# Patient Record
Sex: MALE
[De-identification: names, ages, dates, MRNs, and addresses within clinical notes are randomized per-mention and may not be internally consistent; named-entity substitution may affect disease eponyms.]

---

## 2018-07-17 ENCOUNTER — HOSPITAL ENCOUNTER (OUTPATIENT)
Dept: HOSPITAL 88 - RAD | Age: 63
End: 2018-07-17
Attending: SPECIALIST
Payer: MEDICARE

## 2018-07-17 DIAGNOSIS — J44.9: Primary | ICD-10-CM

## 2018-07-17 DIAGNOSIS — M17.12: ICD-10-CM

## 2018-07-17 PROCEDURE — 71046 X-RAY EXAM CHEST 2 VIEWS: CPT

## 2018-07-17 NOTE — DIAGNOSTIC IMAGING REPORT
PROCEDURE:X-RAY LEFT KNEE, THREE OR MORE VIEWS

 

COMPARISON:None.

 

INDICATIONS:OSTEOARTHRITIS LEFT KNEE

 

FINDINGS:

The bones are well-mineralized. No evidence of fracture or 

malalignment. Mild joint space narrowing is present in the medial and 

patellofemoral compartments with bony osteophyte formation. Small 

suprapatellar joint effusion.

 

CONCLUSION:

Mild medial and patellofemoral compartment osteoarthritis. 

 

Small suprapatellar joint effusion. 

 

Dictated by:  ALPHONSE MCCRARY M.D. on 7/17/2018 at 10:52     

Electronically approved by:  ALPHONSE MCCRARY M.D. on 7/17/2018 at 10:52

## 2018-07-17 NOTE — DIAGNOSTIC IMAGING REPORT
PROCEDURE:

Frontal and lateral views of the chest.

 

COMPARISON: Chest radiograph 10/2/2015.

 

INDICATIONS:   COPD

     

FINDINGS:

Lines/tubes:  None.

 

Lungs: Low lung volumes. No evidence of pulmonary edema. There is 

linear subsegmental atelectasis at the right lung base. Mild patchy 

opacities are present in the left lung base.

 

Pleura:  There is no pleural effusion or pneumothorax.

 

Heart and mediastinum:  The cardiomediastinal silhouette is 

unremarkable.

 

Bones:  No acute bony abnormality.

 

IMPRESSION: 

Patchy opacities at the left lung base, which may reflect atelectasis 

or pneumonia in the appropriate clinical setting. 

 

Subsegmental atelectasis at the right lung base.

 

Dictated by:  ALPHONSE MCCRARY M.D. on 7/17/2018 at 10:49     

Electronically approved by:  ALPHONSE MCCRARY M.D. on 7/17/2018 at 10:49

## 2018-10-01 ENCOUNTER — HOSPITAL ENCOUNTER (OUTPATIENT)
Dept: HOSPITAL 88 - CT | Age: 63
End: 2018-10-01
Attending: INTERNAL MEDICINE
Payer: MEDICARE

## 2018-10-01 DIAGNOSIS — R05: Primary | ICD-10-CM

## 2018-10-01 DIAGNOSIS — J01.80: ICD-10-CM

## 2018-10-01 PROCEDURE — 70486 CT MAXILLOFACIAL W/O DYE: CPT

## 2018-10-01 NOTE — DIAGNOSTIC IMAGING REPORT
Examination: CT Face without Contrast

History: Facial pressure. Cough.

Comparison studies: None



Technique:

Axial images were obtained through the maxillofacial region. Coronal and

sagittal reconstructions obtained from the axial data. 

Dose modulation, iterative reconstruction, and/or weight based adjustment of

the mA/kV was utilized to reduce the radiation dose to as low as reasonably

achievable. 

Intravenous contrast: None



Findings:



Soft tissues: 

No abnormalities.



Bones: 

No fractures or bony abnormalities.



Orbits: 

Globes: Intact

Extra or intraconal abnormalities: None.



Paranasal sinuses:

Mild inflammatory mucosal thickening of the bilateral maxillary and sphenoid

sinuses and bilateral ethmoid air cells.

Focal obstruction at the ostia of the bilateral ostiomeatal units and

sphenoethmoidal recesses.



Nasal cavity:

Rightward deviation (5 mm).



IMPRESSION: 



1.  No acute facial abnormality. 



2.  Mild inflammatory mucosal thickening of the bilateral maxillary and

sphenoid sinuses and ethmoid air cells. 



Signed by: Dr. Elizabeth Pyle M.D. on 10/1/2018 5:44 PM

## 2019-07-03 ENCOUNTER — HOSPITAL ENCOUNTER (OUTPATIENT)
Dept: HOSPITAL 88 - CT | Age: 64
End: 2019-07-03
Attending: SPECIALIST
Payer: MEDICARE

## 2019-07-03 DIAGNOSIS — R22.1: Primary | ICD-10-CM

## 2019-07-03 LAB
BUN SERPL-MCNC: 13 MG/DL (ref 7–26)
BUN/CREAT SERPL: 15 (ref 6–25)
EGFRCR SERPLBLD CKD-EPI 2021: > 60 ML/MIN (ref 60–?)

## 2019-07-03 PROCEDURE — 84520 ASSAY OF UREA NITROGEN: CPT

## 2019-07-03 PROCEDURE — 36415 COLL VENOUS BLD VENIPUNCTURE: CPT

## 2019-07-03 PROCEDURE — 82565 ASSAY OF CREATININE: CPT

## 2019-07-03 PROCEDURE — 70491 CT SOFT TISSUE NECK W/DYE: CPT

## 2019-07-03 NOTE — DIAGNOSTIC IMAGING REPORT
Examination:CT SOFT TISSUE NECK WITH CONTRAST



History: Lump in the right anterior neck

Comparison studies: None



Technique: 

Axial images from the skull base to the thoracic inlet

Coronal and sagittal reformatted images.

Dose modulation, iterative reconstruction, and/or weight based adjustment of

the mA/kV was utilized to reduce the radiation dose to as low as reasonably

achievable. 

Intravenous contrast: 100mL of Isovue 370.



Findings:



Soft tissues:

No abnormalities.



Aerodigestive tract: 

No abnormality.



Lymph nodes:

No radiographically significant adenopathy.



Vessels: 

Arteries and veins are patent.



Thyroid gland: 

Normal in size and homogeneous.



Submandibular glands:

Normal in size and homogeneous.



Parotid glands:

Normal in size and homogeneous.



Orbits: No abnormalities.

Paranasal sinuses: Clear.

Temporal bones: No abnormalities.

Skull base and facial bones:  Intact.



Cervical spine: 

C5-C: Diffuse disc osteophyte complex and mild bilateral uncovertebral

arthropathy result in moderate to severe bilateral foraminal stenosis. No canal

stenosis.

The remaining cervical level demonstrate no disc bulge or herniation or

foraminal or canal stenosis.



Visualized lung apices:

No abnormalities.



IMPRESSION:



1.  No lesion in the right neck.



2.  Mild degenerative changes at C5-C6 with moderate to severe bilateral

foraminal stenosis. No canal stenosis.





Signed by: Dr. Elizabeth Pyle M.D. on 7/3/2019 5:39 PM

## 2020-07-02 ENCOUNTER — HOSPITAL ENCOUNTER (EMERGENCY)
Dept: HOSPITAL 88 - ER | Age: 65
LOS: 1 days | Discharge: HOME | End: 2020-07-03
Payer: MEDICARE

## 2020-07-02 VITALS — WEIGHT: 245 LBS | HEIGHT: 72 IN | BODY MASS INDEX: 33.18 KG/M2

## 2020-07-02 DIAGNOSIS — R50.9: ICD-10-CM

## 2020-07-02 DIAGNOSIS — R05: ICD-10-CM

## 2020-07-02 DIAGNOSIS — U07.1: Primary | ICD-10-CM

## 2020-07-02 DIAGNOSIS — R09.02: ICD-10-CM

## 2020-07-02 LAB
ALBUMIN SERPL-MCNC: 3.6 G/DL (ref 3.5–5)
ALBUMIN/GLOB SERPL: 1.1 {RATIO} (ref 0.8–2)
ALP SERPL-CCNC: 57 IU/L (ref 40–150)
ALT SERPL-CCNC: 38 IU/L (ref 0–55)
ANION GAP SERPL CALC-SCNC: 14.9 MMOL/L (ref 8–16)
BASOPHILS # BLD AUTO: 0 10*3/UL (ref 0–0.1)
BASOPHILS NFR BLD AUTO: 0.1 % (ref 0–1)
BUN SERPL-MCNC: 17 MG/DL (ref 7–26)
BUN/CREAT SERPL: 18 (ref 6–25)
CALCIUM SERPL-MCNC: 9.1 MG/DL (ref 8.4–10.2)
CHLORIDE SERPL-SCNC: 98 MMOL/L (ref 98–107)
CK MB SERPL-MCNC: 6.8 NG/ML (ref 0–5)
CK SERPL-CCNC: 386 IU/L (ref 30–200)
CO2 SERPL-SCNC: 27 MMOL/L (ref 22–29)
DEPRECATED NEUTROPHILS # BLD AUTO: 5.5 10*3/UL (ref 2.1–6.9)
EGFRCR SERPLBLD CKD-EPI 2021: > 60 ML/MIN (ref 60–?)
EOSINOPHIL # BLD AUTO: 0 10*3/UL (ref 0–0.4)
EOSINOPHIL NFR BLD AUTO: 0 % (ref 0–6)
ERYTHROCYTE [DISTWIDTH] IN CORD BLOOD: 13.3 % (ref 11.7–14.4)
GLOBULIN PLAS-MCNC: 3.2 G/DL (ref 2.3–3.5)
GLUCOSE SERPLBLD-MCNC: 105 MG/DL (ref 74–118)
HCT VFR BLD AUTO: 46.4 % (ref 38.2–49.6)
HGB BLD-MCNC: 15.6 G/DL (ref 14–18)
LYMPHOCYTES # BLD: 0.8 10*3/UL (ref 1–3.2)
LYMPHOCYTES NFR BLD AUTO: 11 % (ref 18–39.1)
MCH RBC QN AUTO: 30.1 PG (ref 28–32)
MCHC RBC AUTO-ENTMCNC: 33.6 G/DL (ref 31–35)
MCV RBC AUTO: 89.4 FL (ref 81–99)
MONOCYTES # BLD AUTO: 0.6 10*3/UL (ref 0.2–0.8)
MONOCYTES NFR BLD AUTO: 8.4 % (ref 4.4–11.3)
NEUTS SEG NFR BLD AUTO: 80.1 % (ref 38.7–80)
PLATELET # BLD AUTO: 150 X10E3/UL (ref 140–360)
POTASSIUM SERPL-SCNC: 3.9 MMOL/L (ref 3.5–5.1)
RBC # BLD AUTO: 5.19 X10E6/UL (ref 4.3–5.7)
SODIUM SERPL-SCNC: 136 MMOL/L (ref 136–145)

## 2020-07-02 PROCEDURE — 80053 COMPREHEN METABOLIC PANEL: CPT

## 2020-07-02 PROCEDURE — 85025 COMPLETE CBC W/AUTO DIFF WBC: CPT

## 2020-07-02 PROCEDURE — 82553 CREATINE MB FRACTION: CPT

## 2020-07-02 PROCEDURE — 84484 ASSAY OF TROPONIN QUANT: CPT

## 2020-07-02 PROCEDURE — 93005 ELECTROCARDIOGRAM TRACING: CPT

## 2020-07-02 PROCEDURE — 71045 X-RAY EXAM CHEST 1 VIEW: CPT

## 2020-07-02 PROCEDURE — 82550 ASSAY OF CK (CPK): CPT

## 2020-07-02 PROCEDURE — 36415 COLL VENOUS BLD VENIPUNCTURE: CPT

## 2020-07-02 PROCEDURE — 99284 EMERGENCY DEPT VISIT MOD MDM: CPT

## 2020-07-02 PROCEDURE — 83880 ASSAY OF NATRIURETIC PEPTIDE: CPT

## 2020-07-02 PROCEDURE — 87635 SARS-COV-2 COVID-19 AMP PRB: CPT

## 2020-07-02 NOTE — DIAGNOSTIC IMAGING REPORT
EXAMINATION:  CHEST SINGLE (PORTABLE)    



INDICATION:      

Shortness of breath



COMPARISON:  None

     

FINDINGS:  AP view   



TUBES and LINES:  None.



LUNGS: Subtle left lower lobe airspace consolidation. The right lung is grossly

clear.



PLEURA:  No pleural effusion or pneumothorax.



HEART AND MEDIASTINUM:  The cardiomediastinal silhouette is unremarkable.    



BONES AND SOFT TISSUES:  No acute osseous lesion.  Soft tissues are

unremarkable.



UPPER ABDOMEN: No free air under the diaphragm.    



IMPRESSION: 



Subtle left lower lobe airspace consolidation suggestive of pneumonia. A

follow-up chest x-ray is recommended in 6 weeks to document resolution.





Signed by: Aris King MD on 7/2/2020 10:46 PM

## 2020-07-02 NOTE — EMERGENCY DEPARTMENT NOTE
History of Present Illnes


History of Present Illness


History of Present Illness


This is a 64 year old  male sent by PCP for evaluation possible COVID-19

infection. Spouse with positive results .





Past Medical/Family History


Physician Review


I have reviewed the patient's past medical and family history.  Any updates have

been documented here.





Past Medical History


Other Medical History:  


LYPHEDEMA





HIGH CHOLESTEROL


Other Surgery:  


LEFT ROTATOR CUFF





LEFT KNEE SURGERY





BILATERAL CARPAL TUNNEL





TONSILECTOMY





Other


Last Tetanus:  UTD





Physical Exam


Related Data


Allergies:  


Coded Allergies:  


     No Known Allergies (Unverified , 3/12/13)





Physical Exam


CONSTITUTIONAL





Constitutional:  Present well-developed, Present well-nourished, Present ill 

appearing


HENT


HENT:  Present normocephalic, Present atraumatic, Present oropharynx sanjana

r/moist, Present nose normal


HENT L/R:  Present left ext ear normal, Present right ext ear normal


EYES





Eyes:  Reports PERRL, Reports conjunctivae normal


NECK


Neck:  Present ROM normal


PULMONARY


Pulmonary:  Present effort normal, Present breath sounds normal


CARDIOVASCULAR





Cardiovascular:  Present regular rhythm, Present heart sounds normal, Present 

capillary refill normal, Present normal rate


GASTROINTESTINAL





Abdominal:  Present soft, Present nontender, Present bowel sounds normal


GENITOURINARY





Genitourinary:  Present exam deferred


SKIN


Skin:  Present warm, Present dry


MUSCULOSKELETAL





Musculoskeletal:  Present ROM normal


NEUROLOGICAL





Neurological:  Present alert, Present oriented x 3, Present no gross motor or 

sensory deficits


PSYCHOLOGICAL


Psychological:  Present mood/affect normal, Present judgement normal





Results


Imaging


Imaging results reviewed:  Yes


Impressions





[place] [id number]








Patient Name: CHEYANNE MONTEJO         MR #: [id number]      


: 1955   Age/Sex: 64/M


Acct #: [id number]   Req #: [id number]


Adm Physician:       


Ordered by: SALLY MENDOZA DO      Report #: [id number]   


Location:       Room/Bed:    


 

________________________________________________________________________________


___________________





Procedure: [id number] DX/CHEST SINGLE (PORTABLE)


Exam Date: 20                            Exam Time: 0








REPORT STATUS: Signed





EXAMINATION:  CHEST SINGLE (PORTABLE)    





INDICATION:      


Shortness of breath





COMPARISON:  None


     


FINDINGS:  AP view   





TUBES and LINES:  None.





LUNGS: Subtle left lower lobe airspace consolidation. The right lung is grossly


clear.





PLEURA:  No pleural effusion or pneumothorax.





HEART AND MEDIASTINUM:  The cardiomediastinal silhouette is unremarkable.    





BONES AND SOFT TISSUES:  No acute osseous lesion.  Soft tissues are


unremarkable.





UPPER ABDOMEN: No free air under the diaphragm.    





IMPRESSION: 





Subtle left lower lobe airspace consolidation suggestive of pneumonia. A


follow-up chest x-ray is recommended in 6 weeks to document resolution.








Signed by: Felicia Alexandra MD on 2020 10:46 PM








Dictated By: FELICIA ALEXANDRA MD


Electronically Signed By: FELICIA ALEXANDRA MD on 20


Transcribed By: TI on 20 








COPY TO:   SALLY MENDOZA DO~





Procedures


12 Lead ECG Interpretation


ECG Interpretation :  


   ECG:  ECG 1


   :  Interpreted by ED physician


   Date:  2020


   Time:  21:56


   Prior ECG tracings:  reviewed


   Rhythm:  sinus rhythm


   Rate:  normal


   BPM:  85


   ST segments normal:  Yes


   T waves normal:  Yes


   Pacin% capture


   Clinical Impression:  normal ECG





Assessment & Plan


Assessment & Plan


Final Impression:  


(1) Hypoxia


Home Meds


Reported Medications


Fluticasone Propionate (FLOVENT DISKUS) 50 Mcg Disk.w.dev


   3/12/13


Azelastine Hcl (AZELASTINE HCL) 6 Ml Drops, OS BID


   3/12/13


Garlic (GARLIC) 1,000 Mg Capsule, 1000 MG PO DAILY


   3/12/13


Simvastatin (SIMVASTATIN) 20 Mg Tablet, 20 MG PO DAILY


   3/12/13


Hydrochlorothiazide* (ESIDRIX*) 25 Mg Tab, 75 MG PO DAILY


   3/12/13


Hydrocodone Bit/Acetaminophen* (VICODIN 5-500 TABLET*) 1 Each Tablet, 5 MG PO 

PRN


   3/12/13











SALLY MENDOZA DO                 2020 20:53

## 2020-07-03 VITALS — DIASTOLIC BLOOD PRESSURE: 72 MMHG | SYSTOLIC BLOOD PRESSURE: 104 MMHG

## 2020-07-06 ENCOUNTER — HOSPITAL ENCOUNTER (EMERGENCY)
Dept: HOSPITAL 88 - ER | Age: 65
Discharge: HOME | End: 2020-07-06
Payer: MEDICARE

## 2020-07-06 VITALS — HEIGHT: 72 IN | BODY MASS INDEX: 33.18 KG/M2 | WEIGHT: 245 LBS

## 2020-07-06 DIAGNOSIS — I10: ICD-10-CM

## 2020-07-06 DIAGNOSIS — R05: ICD-10-CM

## 2020-07-06 DIAGNOSIS — Z85.46: ICD-10-CM

## 2020-07-06 DIAGNOSIS — U07.1: Primary | ICD-10-CM

## 2020-07-06 DIAGNOSIS — E78.5: ICD-10-CM

## 2020-07-06 DIAGNOSIS — J44.9: ICD-10-CM

## 2020-07-06 PROCEDURE — 99282 EMERGENCY DEPT VISIT SF MDM: CPT

## 2020-07-06 PROCEDURE — 71045 X-RAY EXAM CHEST 1 VIEW: CPT

## 2020-07-06 NOTE — NUR
CALLED BY DR JIMENEZ TO ARRANGE HOME 02 ON THIS PT

SATS 88% ON ROOM AIR

CHOICE LETTER SIGNED BY PT AND COPY TO PT ALONG WITH MY BUSINESS CARD

CALLED AND FAXED ORDERS TO [facility]

PH: [phone]  THN-[phone]

CONFIRMATION REC'D

CONFIRMED PT'S ADDRESS AND PHONE NUMBER CORRECT

EXPLAINED TO PT TO CALL [facility] WHEN HE GETS HOME FOR CONCENTRATOR DELIVERY

PROVIDED PT WITH PORTABLE 02 TO GET HOME

## 2020-07-06 NOTE — DIAGNOSTIC IMAGING REPORT
EXAMINATION:  CHEST SINGLE (PORTABLE)    



INDICATION: Pneumonia



COMPARISON: Chest radiograph 7/2/2020

     

FINDINGS:



LINES/TUBES:None



LUNGS:Left greater than right bibasilar patchy opacities.



PLEURA:No pleural effusion or pneumothorax.



MEDIASTINUM:The cardiomediastinal silhouette appears normal in size and shape.



BONES/SOFT TISSUES:No acute osseous injury.



ABDOMEN:No free air under the diaphragm.





IMPRESSION: 

Left greater than right bibasilar patchy opacities may represent subsegmental

atelectasis however superimposed pneumonia could also have this appearance in

the proper clinical setting.



Signed by: Clayton Barlow MD on 7/6/2020 3:58 PM

## 2020-07-06 NOTE — EMERGENCY DEPARTMENT NOTE
History of Present Illnes


History of Present Illness


Chief Complaint:  COVID PUI


History of Present Illness


This is a 64 year old  male arrives to the ED with continued cough, 

patient tested positive Covid 19. Patient states feels a history of COPD, is not

on home oxygen. Patient states the coughing got worse which prompted him to the 

emergency department.


Arrival Mode:  Car


Onset (how long ago):  day(s)


Severity:  mild


Onset quality:  gradual


Timing of current episode:  constant


Treatments prior to arrival:  none





Past Medical/Family History


Physician Review


I have reviewed the patient's past medical and family history.  Any updates have

been documented here.





Past Medical History


Recent Fever:  Yes


Clinical Suspicion of Infectio:  Yes


New/Unexplained Change in Ment:  No


Past Medical History:  Hypertension, COPD, Asthma, Cancer, Hyperlipedemia


Other Medical History:  


PROSTATE CANCER


Past Surgical History:  Knee Replacement, Back Surgery


Other Surgery:  


BILATERAL WRIST





LEFT KNEE





LEFT SHOULDER





Social History


Physically hurt or threatened:  No





Other


Last Tetanus:  UTD





Review of Systems


Review of Systems


Constitutional:  Reports as per HPI, Reports chills, Reports fever


EENTM:  Reports no symptoms


Cardiovascular:  Reports no symptoms


Respiratory:  Reports as per HPI, Reports cough


Gastrointestinal:  Reports no symptoms


Genitourinary:  Reports no symptoms


Musculoskeletal:  Reports no symptoms


Integumentary:  Reports no symptoms


Neurological:  Reports no symptoms


Psychological:  Reports no symptoms


Endocrine:  Reports no symptoms


Hematological/Lymphatic:  Reports no symptoms





Physical Exam


Related Data


Allergies:  


Coded Allergies:  


     No Known Allergies (Unverified , 3/12/13)


Triage Vital Signs





Vital Signs








  Date Time  Temp Pulse Resp B/P (MAP) Pulse Ox O2 Delivery O2 Flow Rate FiO2


 


7/6/20 13:17 99.6 89 24 113/74 91 Room Air  








Vital signs reviewed:  Yes





Physical Exam


CONSTITUTIONAL





Constitutional:  Present well-developed, Present well-nourished


HENT


HENT:  Present normocephalic, Present atraumatic, Present oropharynx 

clear/moist, Present nose normal


HENT L/R:  Present left ext ear normal, Present right ext ear normal


EYES





Eyes:  Reports PERRL, Reports conjunctivae normal


NECK


Neck:  Present ROM normal


PULMONARY


Pulmonary:  Present effort normal, Present breath sounds normal


CARDIOVASCULAR





Cardiovascular:  Present regular rhythm, Present heart sounds normal, Present 

capillary refill normal, Present normal rate


GASTROINTESTINAL





Abdominal:  Present soft, Present nontender, Present bowel sounds normal


GENITOURINARY





Genitourinary:  Present exam deferred


SKIN


Skin:  Present warm, Present dry


MUSCULOSKELETAL





Musculoskeletal:  Present ROM normal


NEUROLOGICAL





Neurological:  Present alert, Present oriented x 3, Present no gross motor or 

sensory deficits


PSYCHOLOGICAL


Psychological:  Present mood/affect normal, Present judgement normal





Results


Imaging


Imaging results reviewed:  Yes





Assessment & Plan


Medical Decision Making


MDM


62-year-old well-appearing male arrives to the ED with complaints of cough fever

loss of taste and smell. Patient has a known diagnosis of Covid 19. PPatient's 

oxygen saturation 91% even on exertion, no evidence of tachypnea or dyspnea 

noted in the ED. Spoke present length about the importance of sleeping on his 

stomach and rotating from side to side. Z-Toñito given, signs and symptoms for 

return discussed.


Case management evaluation done emergency department- home O2 was given. Patient

did not wish to be admitted to the hospital. Patient's pulmonologist Dr. Melgar 

informed





In the light of the Covid pandemic, disaster medicine care was given-  


Patient's imaging reviewed,- chest x-ray shows questionable patchy airspace 

opacities . Patient clinically appears well, outpatient pulmonary follow-up 

given. The red flags for return to emergency department given. Patient 

understands the emergency department is open at all times to serve his needs as 

well as the needs of the community and given that he requires no supplemental 

oxygen and is speaking in full sentences with no distress he is stable to go 

home and quarantine.





Assessment & Plan


Final Impression:  


(1) COVID-19


Depart Disposition:  HOME, SELF-CARE


Last Vital Signs











  Date Time  Temp Pulse Resp B/P (MAP) Pulse Ox O2 Delivery O2 Flow Rate FiO2


 


7/6/20 13:17 99.6 89 24 113/74 91 Room Air  








Home Meds


Active Scripts


Azithromycin (Z-TOÑITO) 250 Mg Tablet, 1 PKG PO AS DIRECTED, #1 PKG 0 Refills


   Prov:SANDHIR, AMBICA, DO         7/6/20


Dexamethasone (Decadron) 6 Mg Tablet, 6 MG PO DAILY, #5


   Prov:SANDHIR, AMBICA, DO         7/6/20


Reported Medications


Fluticasone Propionate (FLOVENT DISKUS) 50 Mcg Disk.w.dev


   3/12/13


Azelastine Hcl (AZELASTINE HCL) 6 Ml Drops, OS BID


   3/12/13


Garlic (GARLIC) 1,000 Mg Capsule, 1000 MG PO DAILY


   3/12/13


Simvastatin (SIMVASTATIN) 20 Mg Tablet, 20 MG PO DAILY


   3/12/13


Hydrochlorothiazide* (ESIDRIX*) 25 Mg Tab, 75 MG PO DAILY


   3/12/13


Hydrocodone Bit/Acetaminophen* (VICODIN 5-500 TABLET*) 1 Each Tablet, 5 MG PO 

PRN


   3/12/13











MARLINE JIMENEZ,              Jul 6, 2020 14:09